# Patient Record
Sex: FEMALE | Race: WHITE | NOT HISPANIC OR LATINO | Employment: OTHER | URBAN - METROPOLITAN AREA
[De-identification: names, ages, dates, MRNs, and addresses within clinical notes are randomized per-mention and may not be internally consistent; named-entity substitution may affect disease eponyms.]

---

## 2024-08-15 ENCOUNTER — HOSPITAL ENCOUNTER (EMERGENCY)
Facility: CLINIC | Age: 69
Discharge: HOME | End: 2024-08-15
Attending: EMERGENCY MEDICINE | Admitting: EMERGENCY MEDICINE
Payer: MEDICARE

## 2024-08-15 VITALS
RESPIRATION RATE: 18 BRPM | HEIGHT: 63 IN | DIASTOLIC BLOOD PRESSURE: 81 MMHG | HEART RATE: 61 BPM | SYSTOLIC BLOOD PRESSURE: 129 MMHG | WEIGHT: 170 LBS | TEMPERATURE: 98.5 F | BODY MASS INDEX: 30.12 KG/M2 | OXYGEN SATURATION: 98 %

## 2024-08-15 DIAGNOSIS — E87.6 LOW SERUM POTASSIUM: ICD-10-CM

## 2024-08-15 DIAGNOSIS — R42 LIGHTHEADEDNESS: ICD-10-CM

## 2024-08-15 DIAGNOSIS — R55 NEAR SYNCOPE: ICD-10-CM

## 2024-08-15 LAB
ALBUMIN SERPL BCG-MCNC: 4.4 G/DL (ref 3.5–5.2)
ALBUMIN UR-MCNC: NEGATIVE MG/DL
ALP SERPL-CCNC: 73 U/L (ref 40–150)
ALT SERPL W P-5'-P-CCNC: 30 U/L (ref 0–50)
ANION GAP SERPL CALCULATED.3IONS-SCNC: 11 MMOL/L (ref 7–15)
APPEARANCE UR: CLEAR
AST SERPL W P-5'-P-CCNC: 39 U/L (ref 0–45)
BASOPHILS # BLD AUTO: 0.1 10E3/UL (ref 0–0.2)
BASOPHILS NFR BLD AUTO: 1 %
BILIRUB SERPL-MCNC: 0.5 MG/DL
BILIRUB UR QL STRIP: NEGATIVE
BUN SERPL-MCNC: 17.3 MG/DL (ref 8–23)
CALCIUM SERPL-MCNC: 9.6 MG/DL (ref 8.8–10.4)
CHLORIDE SERPL-SCNC: 98 MMOL/L (ref 98–107)
COLOR UR AUTO: ABNORMAL
CREAT SERPL-MCNC: 0.79 MG/DL (ref 0.51–0.95)
D DIMER PPP FEU-MCNC: 0.44 UG/ML FEU (ref 0–0.5)
EGFRCR SERPLBLD CKD-EPI 2021: 80 ML/MIN/1.73M2
EOSINOPHIL # BLD AUTO: 0.2 10E3/UL (ref 0–0.7)
EOSINOPHIL NFR BLD AUTO: 2 %
ERYTHROCYTE [DISTWIDTH] IN BLOOD BY AUTOMATED COUNT: 12.9 % (ref 10–15)
GLUCOSE SERPL-MCNC: 125 MG/DL (ref 70–99)
GLUCOSE UR STRIP-MCNC: NEGATIVE MG/DL
HCO3 SERPL-SCNC: 29 MMOL/L (ref 22–29)
HCT VFR BLD AUTO: 45 % (ref 35–47)
HGB BLD-MCNC: 15.2 G/DL (ref 11.7–15.7)
HGB UR QL STRIP: NEGATIVE
IMM GRANULOCYTES # BLD: 0 10E3/UL
IMM GRANULOCYTES NFR BLD: 0 %
KETONES UR STRIP-MCNC: NEGATIVE MG/DL
LEUKOCYTE ESTERASE UR QL STRIP: ABNORMAL
LYMPHOCYTES # BLD AUTO: 2.9 10E3/UL (ref 0.8–5.3)
LYMPHOCYTES NFR BLD AUTO: 32 %
MCH RBC QN AUTO: 27.7 PG (ref 26.5–33)
MCHC RBC AUTO-ENTMCNC: 33.8 G/DL (ref 31.5–36.5)
MCV RBC AUTO: 82 FL (ref 78–100)
MONOCYTES # BLD AUTO: 0.7 10E3/UL (ref 0–1.3)
MONOCYTES NFR BLD AUTO: 7 %
MUCOUS THREADS #/AREA URNS LPF: PRESENT /LPF
NEUTROPHILS # BLD AUTO: 5.4 10E3/UL (ref 1.6–8.3)
NEUTROPHILS NFR BLD AUTO: 58 %
NITRATE UR QL: NEGATIVE
NRBC # BLD AUTO: 0 10E3/UL
NRBC BLD AUTO-RTO: 0 /100
PH UR STRIP: 7 [PH] (ref 5–7)
PLATELET # BLD AUTO: 275 10E3/UL (ref 150–450)
POTASSIUM SERPL-SCNC: 3.2 MMOL/L (ref 3.4–5.3)
PROT SERPL-MCNC: 7.4 G/DL (ref 6.4–8.3)
RBC # BLD AUTO: 5.49 10E6/UL (ref 3.8–5.2)
RBC URINE: <1 /HPF
SODIUM SERPL-SCNC: 138 MMOL/L (ref 135–145)
SP GR UR STRIP: 1.01 (ref 1–1.03)
SQUAMOUS EPITHELIAL: 1 /HPF
TROPONIN T SERPL HS-MCNC: 11 NG/L
TROPONIN T SERPL HS-MCNC: 12 NG/L
UROBILINOGEN UR STRIP-MCNC: NORMAL MG/DL
WBC # BLD AUTO: 9.2 10E3/UL (ref 4–11)
WBC URINE: 3 /HPF

## 2024-08-15 PROCEDURE — 84484 ASSAY OF TROPONIN QUANT: CPT | Performed by: EMERGENCY MEDICINE

## 2024-08-15 PROCEDURE — 258N000003 HC RX IP 258 OP 636: Performed by: EMERGENCY MEDICINE

## 2024-08-15 PROCEDURE — 99284 EMERGENCY DEPT VISIT MOD MDM: CPT | Mod: 25

## 2024-08-15 PROCEDURE — 36415 COLL VENOUS BLD VENIPUNCTURE: CPT | Performed by: EMERGENCY MEDICINE

## 2024-08-15 PROCEDURE — 250N000011 HC RX IP 250 OP 636: Performed by: EMERGENCY MEDICINE

## 2024-08-15 PROCEDURE — 81001 URINALYSIS AUTO W/SCOPE: CPT | Performed by: EMERGENCY MEDICINE

## 2024-08-15 PROCEDURE — 96361 HYDRATE IV INFUSION ADD-ON: CPT

## 2024-08-15 PROCEDURE — 85049 AUTOMATED PLATELET COUNT: CPT | Performed by: EMERGENCY MEDICINE

## 2024-08-15 PROCEDURE — 80053 COMPREHEN METABOLIC PANEL: CPT | Performed by: EMERGENCY MEDICINE

## 2024-08-15 PROCEDURE — 96365 THER/PROPH/DIAG IV INF INIT: CPT

## 2024-08-15 PROCEDURE — 250N000013 HC RX MED GY IP 250 OP 250 PS 637: Performed by: EMERGENCY MEDICINE

## 2024-08-15 PROCEDURE — 85379 FIBRIN DEGRADATION QUANT: CPT | Performed by: EMERGENCY MEDICINE

## 2024-08-15 PROCEDURE — 96368 THER/DIAG CONCURRENT INF: CPT

## 2024-08-15 PROCEDURE — 93005 ELECTROCARDIOGRAM TRACING: CPT

## 2024-08-15 RX ORDER — POTASSIUM CHLORIDE 7.45 MG/ML
10 INJECTION INTRAVENOUS ONCE
Status: COMPLETED | OUTPATIENT
Start: 2024-08-15 | End: 2024-08-15

## 2024-08-15 RX ORDER — POTASSIUM CHLORIDE 1.5 G/1.58G
40 POWDER, FOR SOLUTION ORAL ONCE
Status: COMPLETED | OUTPATIENT
Start: 2024-08-15 | End: 2024-08-15

## 2024-08-15 RX ORDER — MAGNESIUM SULFATE HEPTAHYDRATE 40 MG/ML
2 INJECTION, SOLUTION INTRAVENOUS ONCE
Status: COMPLETED | OUTPATIENT
Start: 2024-08-15 | End: 2024-08-15

## 2024-08-15 RX ADMIN — SODIUM CHLORIDE 1000 ML: 9 INJECTION, SOLUTION INTRAVENOUS at 10:37

## 2024-08-15 RX ADMIN — POTASSIUM CHLORIDE 40 MEQ: 1.5 POWDER, FOR SOLUTION ORAL at 12:42

## 2024-08-15 RX ADMIN — POTASSIUM CHLORIDE 10 MEQ: 7.46 INJECTION, SOLUTION INTRAVENOUS at 12:42

## 2024-08-15 RX ADMIN — MAGNESIUM SULFATE HEPTAHYDRATE 2 G: 40 INJECTION, SOLUTION INTRAVENOUS at 12:42

## 2024-08-15 ASSESSMENT — COLUMBIA-SUICIDE SEVERITY RATING SCALE - C-SSRS
6. HAVE YOU EVER DONE ANYTHING, STARTED TO DO ANYTHING, OR PREPARED TO DO ANYTHING TO END YOUR LIFE?: NO
2. HAVE YOU ACTUALLY HAD ANY THOUGHTS OF KILLING YOURSELF IN THE PAST MONTH?: NO
1. IN THE PAST MONTH, HAVE YOU WISHED YOU WERE DEAD OR WISHED YOU COULD GO TO SLEEP AND NOT WAKE UP?: NO

## 2024-08-15 ASSESSMENT — ACTIVITIES OF DAILY LIVING (ADL)
ADLS_ACUITY_SCORE: 35

## 2024-08-15 NOTE — ED TRIAGE NOTES
Patient had intermittent lightheadedness for a few days, this morning had near syncope episode due to dizziness. Had to hang on to the kitchen counter to prevent the fall.      Triage Assessment (Adult)       Row Name 08/15/24 1017          Triage Assessment    Airway WDL WDL        Respiratory WDL    Respiratory WDL WDL        Skin Circulation/Temperature WDL    Skin Circulation/Temperature WDL WDL        Cardiac WDL    Cardiac WDL X        Peripheral/Neurovascular WDL    Peripheral Neurovascular WDL WDL        Cognitive/Neuro/Behavioral WDL    Cognitive/Neuro/Behavioral WDL WDL

## 2024-08-15 NOTE — ED NOTES
Patient noted that she could try and go to the bathroom, patient was walked to the bathroom across the aguilar, patient was able to walk but states that she still feels lightheaded same as before, no change.

## 2024-08-15 NOTE — DISCHARGE INSTRUCTIONS
As we discussed, no clear cause for your fainting was found today, however your workup is overall quite reassuring.  You do need to follow-up with your regular doctor in the next 1 week to go over neck steps, and decide if you need to be placed on a Holter monitor, and also to have your potassium rechecked.  Please come back to the ER immediately with any worsening symptoms or any new concerns that you have or if you have any lightheadedness or fainting episodes again.

## 2024-08-15 NOTE — ED PROVIDER NOTES
"  Emergency Department Note      History of Present Illness     Chief Complaint  Dizziness    HPI  Sandra Clements is a 69 year old female who presents to the emergency room with a significant episode of near fainting earlier today.  She states that she has felt dizzy and lightheaded all week, denies vertigo.  She states that she is never really been this dizzy before but things hit a new level earlier today when she was walking in the kitchen and she actually had to grab onto the kitchen counter so she did not fall.  Did not lose consciousness, did not fall to the ground, but was very concerned.  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  Denies any chest pain or shortness of breath throughout the last week.          Independent Historian  No    Review of External Notes  Yes I have reviewed the patient's last office visit from 19 March of this year with the patient was seen by her internist      Past Medical History   Medical History and Problem List  No past medical history on file.    Medications  IBUPROFEN PO  Metoprolol Tartrate (LOPRESSOR PO)  oxyCODONE-acetaminophen (PERCOCET) 5-325 MG per tablet  TRAMADOL HCL PO        Surgical History   No past surgical history on file.      Physical Exam   Patient Vitals for the past 24 hrs:   BP Temp Temp src Pulse Resp SpO2 Height Weight   08/15/24 1014 (!) 159/79 98.5  F (36.9  C) Temporal 69 20 96 % 1.6 m (5' 3\") 77.1 kg (170 lb)       Physical Exam  Vitals: reviewed by me  General: Pt seen on [facility], pleasant, cooperative, and alert to conversation  Eyes: Tracking well, clear conjunctiva BL  ENT: MMM, midline trachea.   Lungs: No tachypnea, no accessory muscle use. No respiratory distress.   CV: Rate as above, no additional heart sounds heard, specifically no systolic ejection murmur heard.  MSK: no joint effusion.  " No evidence of trauma  Skin: No rash  Neuro: Clear speech and no facial droop.  Psych: Not RIS, no e/o AH/VH      Diagnostics   Lab Results   Labs Ordered and Resulted from Time of ED Arrival to Time of ED Departure   COMPREHENSIVE METABOLIC PANEL - Abnormal       Result Value    Sodium 138      Potassium 3.2 (*)     Carbon Dioxide (CO2) 29      Anion Gap 11      Urea Nitrogen 17.3      Creatinine 0.79      GFR Estimate 80      Calcium 9.6      Chloride 98      Glucose 125 (*)     Alkaline Phosphatase 73      AST 39      ALT 30      Protein Total 7.4      Albumin 4.4      Bilirubin Total 0.5     ROUTINE UA WITH MICROSCOPIC REFLEX TO CULTURE - Abnormal    Color Urine Straw      Appearance Urine Clear      Glucose Urine Negative      Bilirubin Urine Negative      Ketones Urine Negative      Specific Gravity Urine 1.009      Blood Urine Negative      pH Urine 7.0      Protein Albumin Urine Negative      Urobilinogen Urine Normal      Nitrite Urine Negative      Leukocyte Esterase Urine Trace (*)     Mucus Urine Present (*)     RBC Urine <1      WBC Urine 3      Squamous Epithelials Urine 1     CBC WITH PLATELETS AND DIFFERENTIAL - Abnormal    WBC Count 9.2      RBC Count 5.49 (*)     Hemoglobin 15.2      Hematocrit 45.0      MCV 82      MCH 27.7      MCHC 33.8      RDW 12.9      Platelet Count 275      % Neutrophils 58      % Lymphocytes 32      % Monocytes 7      % Eosinophils 2      % Basophils 1      % Immature Granulocytes 0      NRBCs per 100 WBC 0      Absolute Neutrophils 5.4      Absolute Lymphocytes 2.9      Absolute Monocytes 0.7      Absolute Eosinophils 0.2      Absolute Basophils 0.1      Absolute Immature Granulocytes 0.0      Absolute NRBCs 0.0     TROPONIN T, HIGH SENSITIVITY - Normal    Troponin T, High Sensitivity 12     TROPONIN T, HIGH SENSITIVITY - Normal    Troponin T, High Sensitivity 11     D DIMER QUANTITATIVE - Normal    D-Dimer Quantitative 0.44             EKG   ECG results from 08/15/24  "  EKG 12-lead, tracing only     Value    Systolic Blood Pressure     Diastolic Blood Pressure     Ventricular Rate 77    Atrial Rate 77    AL Interval 182    QRS Duration 66        QTc 434    P Axis 12    R AXIS 0    T Axis 30    Interpretation ECG      Sinus rhythm  Inferior infarct , age undetermined  Anterior infarct , age undetermined  Reviewed by Shaheed SILVA                 ED Course      Medications Administered   Medications   sodium chloride 0.9% BOLUS 1,000 mL (0 mLs Intravenous Stopped 8/15/24 1219)   potassium chloride (KLOR-CON) Packet 40 mEq (40 mEq Oral $Given 8/15/24 1242)   potassium chloride 10 mEq in 100 mL sterile water infusion (0 mEq Intravenous Stopped 8/15/24 1409)   magnesium sulfate 2 g in 50 mL sterile water intermittent infusion (0 g Intravenous Stopped 8/15/24 1409)            Optional/Additional Documentation  None      Medical Decision Making / Diagnosis       MDM  This is a very pleasant 69-year-old female who presents to the emergency room with what appears to be an episode of dizziness and near syncope at home.  She states that she has felt nonspecifically \"off\" for the week, but really has only had 1 episode of this type of dizziness and that is what prompted her to come here.  Since she is coming, she states he actually feels much improved and thankfully her vitals are reassuring.  Her EKG shows no evidence of a cardiogenic or arrhythmogenic cause of her symptoms and thankfully her troponins are unremarkable and flat.  D-dimer is also negative, which was done out of an abundance of caution.  She has no residual pain or symptoms or discomfort at this time, and passed her road test quite well.  We talked about setting her up with a Holter monitor, but since she has no palpitations, and normal cardiac workup here, I think this is not necessarily high yield.  Advised the patient to follow with her regular doctor in a week ahead to get her potassium rechecked as it as well, and " also to go over next steps for what to do regarding her near syncope.  She knows to come back to the ER immediately if she has another episode of this, and to stay hydrated and take an potassium rich food over the neck several days.  Highly reliable appearing, I do think will come back to the ER if anything gets worse.      ICD-10 Codes:    ICD-10-CM    1. Lightheadedness  R42       2. Near syncope  R55       3. Low serum potassium  E87.6              Discharge Medications  New Prescriptions    No medications on file                  Jer Hummel MD  08/15/24 9076

## 2024-08-15 NOTE — ED NOTES
Patient ambulated to restroom and down hallway. Patient stated fogginess while ambulating. Patient felt better compared to earlier today.

## 2024-08-15 NOTE — ED NOTES
Notified patient of urine analysis order, patient given cup. Patient said that they needed more time before able to collect sample.

## 2024-08-16 LAB
ATRIAL RATE - MUSE: 77 BPM
DIASTOLIC BLOOD PRESSURE - MUSE: NORMAL MMHG
INTERPRETATION ECG - MUSE: NORMAL
P AXIS - MUSE: 12 DEGREES
PR INTERVAL - MUSE: 182 MS
QRS DURATION - MUSE: 66 MS
QT - MUSE: 384 MS
QTC - MUSE: 434 MS
R AXIS - MUSE: 0 DEGREES
SYSTOLIC BLOOD PRESSURE - MUSE: NORMAL MMHG
T AXIS - MUSE: 30 DEGREES
VENTRICULAR RATE- MUSE: 77 BPM